# Patient Record
Sex: MALE | Race: WHITE | NOT HISPANIC OR LATINO | Employment: OTHER | URBAN - METROPOLITAN AREA
[De-identification: names, ages, dates, MRNs, and addresses within clinical notes are randomized per-mention and may not be internally consistent; named-entity substitution may affect disease eponyms.]

---

## 2023-05-02 ENCOUNTER — APPOINTMENT (OUTPATIENT)
Dept: PRIMARY CARE | Facility: CLINIC | Age: 28
End: 2023-05-02

## 2023-10-04 ENCOUNTER — TELEPHONE (OUTPATIENT)
Dept: PRIMARY CARE | Facility: CLINIC | Age: 28
End: 2023-10-04

## 2023-10-04 NOTE — TELEPHONE ENCOUNTER
Pt is requesting lab order to test for Thalassemia.  Pt states his girlfriend has this and he just  wants to rule this out now before he has any children in the future.     Does he need an appointment to discuss?    Norman # [phone]

## 2023-10-13 ENCOUNTER — OFFICE VISIT (OUTPATIENT)
Dept: PRIMARY CARE | Facility: CLINIC | Age: 28
End: 2023-10-13

## 2023-10-13 ENCOUNTER — LAB (OUTPATIENT)
Dept: LAB | Facility: LAB | Age: 28
End: 2023-10-13

## 2023-10-13 VITALS
DIASTOLIC BLOOD PRESSURE: 86 MMHG | SYSTOLIC BLOOD PRESSURE: 130 MMHG | WEIGHT: 168.6 LBS | HEART RATE: 92 BPM | HEIGHT: 70 IN | BODY MASS INDEX: 24.14 KG/M2

## 2023-10-13 DIAGNOSIS — R79.89 ABNORMAL COMPLETE BLOOD COUNT: ICD-10-CM

## 2023-10-13 DIAGNOSIS — R79.89 ABNORMAL COMPLETE BLOOD COUNT: Primary | ICD-10-CM

## 2023-10-13 PROBLEM — J30.9 ALLERGIC RHINITIS: Status: ACTIVE | Noted: 2023-10-13

## 2023-10-13 PROBLEM — L23.9 CONTACT ALLERGIC REACTION: Status: ACTIVE | Noted: 2023-10-13

## 2023-10-13 PROBLEM — E78.5 HYPERLIPIDEMIA: Status: ACTIVE | Noted: 2023-10-13

## 2023-10-13 PROCEDURE — 83550 IRON BINDING TEST: CPT

## 2023-10-13 PROCEDURE — 82728 ASSAY OF FERRITIN: CPT

## 2023-10-13 PROCEDURE — 99214 OFFICE O/P EST MOD 30 MIN: CPT | Performed by: FAMILY MEDICINE

## 2023-10-13 PROCEDURE — 85025 COMPLETE CBC W/AUTO DIFF WBC: CPT

## 2023-10-13 PROCEDURE — 1036F TOBACCO NON-USER: CPT | Performed by: FAMILY MEDICINE

## 2023-10-13 PROCEDURE — 83540 ASSAY OF IRON: CPT

## 2023-10-13 PROCEDURE — 36415 COLL VENOUS BLD VENIPUNCTURE: CPT

## 2023-10-13 PROCEDURE — 83020 HEMOGLOBIN ELECTROPHORESIS: CPT | Performed by: FAMILY MEDICINE

## 2023-10-13 PROCEDURE — 83021 HEMOGLOBIN CHROMOTOGRAPHY: CPT

## 2023-10-13 ASSESSMENT — ENCOUNTER SYMPTOMS
PALPITATIONS: 0
EYE DISCHARGE: 0
CONFUSION: 0
ACTIVITY CHANGE: 0
FACIAL SWELLING: 0
COLOR CHANGE: 0
CONSTIPATION: 0
DIARRHEA: 0
CHILLS: 0
COUGH: 0
FEVER: 0
WHEEZING: 0
APPETITE CHANGE: 0
ARTHRALGIAS: 0

## 2023-10-13 NOTE — PROGRESS NOTES
"Subjective   Patient ID: Norman Robles is a 28 y.o. male who presents for To discuss testing for Thalassemia. .    HPI   Patient reports that he is concerned that he might have thalassemia.  He is here to discuss further testing to evaluate.    Review of Systems   Constitutional:  Negative for activity change, appetite change, chills and fever.   HENT:  Negative for congestion, ear pain and facial swelling.    Eyes:  Negative for discharge.   Respiratory:  Negative for cough and wheezing.    Cardiovascular:  Negative for chest pain, palpitations and leg swelling.   Gastrointestinal:  Negative for constipation and diarrhea.   Musculoskeletal:  Negative for arthralgias.   Skin:  Negative for color change and pallor.   Neurological:  Negative for syncope.   Psychiatric/Behavioral:  Negative for confusion.        Objective   /86 (BP Location: Left arm)   Pulse 92   Ht 1.784 m (5' 10.25\")   Wt 76.5 kg (168 lb 9.6 oz)   BMI 24.02 kg/m²   BSA Body surface area is 1.95 meters squared.      Physical Exam  Constitutional:       General: He is not in acute distress.     Appearance: Normal appearance. He is not toxic-appearing.   HENT:      Head: Normocephalic.      Right Ear: Tympanic membrane, ear canal and external ear normal.      Left Ear: Tympanic membrane, ear canal and external ear normal.   Eyes:      Conjunctiva/sclera: Conjunctivae normal.      Pupils: Pupils are equal, round, and reactive to light.   Cardiovascular:      Rate and Rhythm: Normal rate and regular rhythm.      Pulses: Normal pulses.      Heart sounds: Normal heart sounds.   Pulmonary:      Effort: No respiratory distress.      Breath sounds: No wheezing, rhonchi or rales.   Musculoskeletal:         General: No swelling or tenderness.   Skin:     Findings: No lesion or rash.   Neurological:      General: No focal deficit present.      Mental Status: He is alert and oriented to person, place, and time. Mental status is at baseline.      Gait: " Gait normal.   Psychiatric:         Mood and Affect: Mood normal.         Behavior: Behavior normal.         Thought Content: Thought content normal.         Judgment: Judgment normal.       No visits with results within 1 Year(s) from this visit.   Latest known visit with results is:   [facility] Encounter on 06/30/2021   Component Date Value Ref Range Status    WBC 06/30/2021 5.0  4.4 - 11.3 x10E9/L Final    nRBC 06/30/2021 0.0  0.0 - 0.0 /100 WBC Final    RBC 06/30/2021 4.91  4.50 - 5.90 x10E12/L Final    Hemoglobin 06/30/2021 15.5  13.5 - 17.5 g/dL Final    Hematocrit 06/30/2021 45.2  41.0 - 52.0 % Final    MCV 06/30/2021 92  80 - 100 fL Final    MCHC 06/30/2021 34.3  32.0 - 36.0 g/dL Final    Platelets 06/30/2021 214  150 - 450 x10E9/L Final    RDW 06/30/2021 12.1  11.5 - 14.5 % Final    Neutrophils % 06/30/2021 43.8  40.0 - 80.0 % Final    Immature Granulocytes %, Automated 06/30/2021 0.2  0.0 - 0.9 % Final    Comment:  Immature Granulocyte Count (IG) includes promyelocytes,    myelocytes and metamyelocytes but does not include bands.   Percent differential counts (%) should be interpreted in the   context of the absolute cell counts (cells/L).      Lymphocytes % 06/30/2021 45.4  13.0 - 44.0 % Final    Monocytes % 06/30/2021 8.4  2.0 - 10.0 % Final    Eosinophils % 06/30/2021 1.8  0.0 - 6.0 % Final    Basophils % 06/30/2021 0.4  0.0 - 2.0 % Final    Neutrophils Absolute 06/30/2021 2.18  1.20 - 7.70 x10E9/L Final    Lymphocytes Absolute 06/30/2021 2.26  1.20 - 4.80 x10E9/L Final    Monocytes Absolute 06/30/2021 0.42  0.10 - 1.00 x10E9/L Final    Eosinophils Absolute 06/30/2021 0.09  0.00 - 0.70 x10E9/L Final    Basophils Absolute 06/30/2021 0.02  0.00 - 0.10 x10E9/L Final    TSH 06/30/2021 1.74  0.44 - 3.98 mIU/L Final    Comment:  TSH testing is performed using different testing    methodology at [facility] than at other    [facility] hospitals. Direct result comparisons should    only be made within the  same method.      Glucose 06/30/2021 89  74 - 99 mg/dL Final    Sodium 06/30/2021 140  136 - 145 mmol/L Final    Potassium 06/30/2021 4.1  3.5 - 5.3 mmol/L Final    Chloride 06/30/2021 104  98 - 107 mmol/L Final    Bicarbonate 06/30/2021 33 (H)  21 - 32 mmol/L Final    Anion Gap 06/30/2021 7 (L)  10 - 20 mmol/L Final    Urea Nitrogen 06/30/2021 19  6 - 23 mg/dL Final    Creatinine 06/30/2021 0.89  0.50 - 1.30 mg/dL Final    GLOMERULAR FILTRATION RATE-NON AFR* 06/30/2021 >60  >60 mL/min/1.73m2 Final    GLOMERULAR FILTRATION RATE-* 06/30/2021 >60  >60 mL/min/1.73m2 Final    Comment:  CALCULATIONS OF ESTIMATED GFR ARE PERFORMED   USING THE MDRD STUDY EQUATION FOR THE   IDMS-TRACEABLE CREATININE METHODS.   CLIN CHEM 2007;53:766-72      Calcium 06/30/2021 9.8  8.6 - 10.6 mg/dL Final    Albumin 06/30/2021 4.5  3.4 - 5.0 g/dL Final    Alkaline Phosphatase 06/30/2021 46  33 - 120 U/L Final    Total Protein 06/30/2021 6.7  6.4 - 8.2 g/dL Final    AST 06/30/2021 15  9 - 39 U/L Final    Total Bilirubin 06/30/2021 0.6  0.0 - 1.2 mg/dL Final    ALT (SGPT) 06/30/2021 17  10 - 52 U/L Final    Comment:  Patients treated with Sulfasalazine may generate    falsely decreased results for ALT.      Cholesterol 06/30/2021 187  0 - 199 mg/dL Final    Comment: .      AGE      DESIRABLE   BORDERLINE HIGH   HIGH     0-19 Y     0 - 169       170 - 199     >/= 200    20-24 Y     0 - 189       190 - 224     >/= 225         >24 Y     0 - 199       200 - 239     >/= 240   **All ranges are based on fasting samples. Specific   therapeutic targets will vary based on patient-specific   cardiac risk.  .   Pediatric guidelines reference:Pediatrics 2011, 128(S5).   Adult guidelines reference: NCEP ATPIII Guidelines,     ZACH 2001, 258:2486-97  .   Venipuncture immediately after or during the    administration of Metamizole may lead to falsely   low results. Testing should be performed immediately   prior to Metamizole dosing.      HDL 06/30/2021  45.2  mg/dL Final    Comment: .      AGE      VERY LOW   LOW     NORMAL    HIGH       0-19 Y       < 35   < 40     40-45     ----    20-24 Y       ----   < 40       >45     ----      >24 Y       ----   < 40     40-60      >60  .      Cholesterol/HDL Ratio 06/30/2021 4.1   Final    Comment: REF VALUES  DESIRABLE  < 3.4  HIGH RISK  > 5.0      LDL 06/30/2021 123 (H)  0 - 119 mg/dL Final    Comment: .                           NEAR      BORD      AGE      DESIRABLE  OPTIMAL    HIGH     HIGH     VERY HIGH     0-19 Y     0 - 109     ---    110-129   >/= 130     ----    20-24 Y     0 - 119     ---    120-159   >/= 160     ----      >24 Y     0 -  99   100-129  130-159   160-189     >/=190  .      VLDL 06/30/2021 18  0 - 40 mg/dL Final    Triglycerides 06/30/2021 92  0 - 149 mg/dL Final    Comment: .      AGE      DESIRABLE   BORDERLINE HIGH   HIGH     VERY HIGH   0 D-90 D    19 - 174         ----         ----        ----  91 D- 9 Y     0 -  74        75 -  99     >/= 100      ----    10-19 Y     0 -  89        90 - 129     >/= 130      ----    20-24 Y     0 - 114       115 - 149     >/= 150      ----         >24 Y     0 - 149       150 - 199    200- 499    >/= 500  .   Venipuncture immediately after or during the    administration of Metamizole may lead to falsely   low results. Testing should be performed immediately   prior to Metamizole dosing.       No current outpatient medications on file prior to visit.     No current facility-administered medications on file prior to visit.     No images are attached to the encounter.            Assessment/Plan   Diagnoses and all orders for this visit:  Abnormal complete blood count    1.  Patient will have additional blood testing performed  2.  Patient to call for questions or concerns

## 2023-10-14 LAB
BASOPHILS # BLD AUTO: 0.02 X10*3/UL (ref 0–0.1)
BASOPHILS NFR BLD AUTO: 0.4 %
EOSINOPHIL # BLD AUTO: 0.1 X10*3/UL (ref 0–0.7)
EOSINOPHIL NFR BLD AUTO: 1.8 %
ERYTHROCYTE [DISTWIDTH] IN BLOOD BY AUTOMATED COUNT: 11.9 % (ref 11.5–14.5)
FERRITIN SERPL-MCNC: 146 NG/ML (ref 20–300)
HCT VFR BLD AUTO: 48.5 % (ref 41–52)
HGB BLD-MCNC: 16.1 G/DL (ref 13.5–17.5)
IMM GRANULOCYTES # BLD AUTO: 0.01 X10*3/UL (ref 0–0.7)
IMM GRANULOCYTES NFR BLD AUTO: 0.2 % (ref 0–0.9)
IRON SATN MFR SERPL: 66 % (ref 25–45)
IRON SERPL-MCNC: 201 UG/DL (ref 35–150)
LYMPHOCYTES # BLD AUTO: 2.42 X10*3/UL (ref 1.2–4.8)
LYMPHOCYTES NFR BLD AUTO: 42.6 %
MCH RBC QN AUTO: 30.2 PG (ref 26–34)
MCHC RBC AUTO-ENTMCNC: 33.2 G/DL (ref 32–36)
MCV RBC AUTO: 91 FL (ref 80–100)
MONOCYTES # BLD AUTO: 0.45 X10*3/UL (ref 0.1–1)
MONOCYTES NFR BLD AUTO: 7.9 %
NEUTROPHILS # BLD AUTO: 2.68 X10*3/UL (ref 1.2–7.7)
NEUTROPHILS NFR BLD AUTO: 47.1 %
NRBC BLD-RTO: 0 /100 WBCS (ref 0–0)
PLATELET # BLD AUTO: 222 X10*3/UL (ref 150–450)
PMV BLD AUTO: 11.8 FL (ref 7.5–11.5)
RBC # BLD AUTO: 5.33 X10*6/UL (ref 4.5–5.9)
TIBC SERPL-MCNC: 306 UG/DL (ref 240–445)
UIBC SERPL-MCNC: 105 UG/DL (ref 110–370)
WBC # BLD AUTO: 5.7 X10*3/UL (ref 4.4–11.3)

## 2023-10-17 LAB
HEMOGLOBIN A2: 2.9 % (ref 2–3.5)
HEMOGLOBIN A: 96.7 % (ref 95.8–98)
HEMOGLOBIN F: 0.4 % (ref 0–2)
HEMOGLOBIN IDENTIFICATION INTERPRETATION: NORMAL
PATH REVIEW-HGB IDENTIFICATION: NORMAL

## 2024-06-13 ENCOUNTER — TELEPHONE (OUTPATIENT)
Dept: PRIMARY CARE | Facility: CLINIC | Age: 29
End: 2024-06-13

## 2024-06-13 DIAGNOSIS — Z00.00 HEALTHCARE MAINTENANCE: ICD-10-CM

## 2024-07-31 ENCOUNTER — APPOINTMENT (OUTPATIENT)
Dept: PRIMARY CARE | Facility: CLINIC | Age: 29
End: 2024-07-31

## 2024-10-15 ENCOUNTER — OFFICE VISIT (OUTPATIENT)
Dept: URGENT CARE | Facility: CLINIC | Age: 29
End: 2024-10-15

## 2024-10-15 VITALS
BODY MASS INDEX: 22.48 KG/M2 | WEIGHT: 157 LBS | OXYGEN SATURATION: 97 % | DIASTOLIC BLOOD PRESSURE: 72 MMHG | TEMPERATURE: 97.7 F | SYSTOLIC BLOOD PRESSURE: 110 MMHG | HEIGHT: 70 IN | RESPIRATION RATE: 20 BRPM | HEART RATE: 90 BPM

## 2024-10-15 DIAGNOSIS — J98.8 RTI (RESPIRATORY TRACT INFECTION): ICD-10-CM

## 2024-10-15 PROBLEM — E78.5 HYPERLIPIDEMIA: Status: ACTIVE | Noted: 2023-10-13

## 2024-10-15 PROBLEM — L23.9 CONTACT ALLERGIC REACTION: Status: ACTIVE | Noted: 2023-10-13

## 2024-10-15 PROBLEM — J30.9 ALLERGIC RHINITIS: Status: ACTIVE | Noted: 2023-10-13

## 2024-10-15 PROCEDURE — 3074F SYST BP LT 130 MM HG: CPT | Performed by: NURSE PRACTITIONER

## 2024-10-15 PROCEDURE — 99203 OFFICE O/P NEW LOW 30 MIN: CPT | Performed by: NURSE PRACTITIONER

## 2024-10-15 PROCEDURE — 3078F DIAST BP <80 MM HG: CPT | Performed by: NURSE PRACTITIONER

## 2024-10-15 RX ORDER — AZITHROMYCIN 250 MG/1
TABLET, FILM COATED ORAL
Qty: 6 TABLET | Refills: 0 | Status: SHIPPED | OUTPATIENT
Start: 2024-10-15

## 2024-10-18 ENCOUNTER — TELEPHONE (OUTPATIENT)
Dept: PRIMARY CARE | Facility: CLINIC | Age: 29
End: 2024-10-18

## 2024-10-18 NOTE — TELEPHONE ENCOUNTER
Pt coming in on 12/02/24 for annual physical. Pt will see PRITESH Betancourt.  BW?  Pt will go down stairs.

## 2024-10-21 DIAGNOSIS — Z00.00 HEALTHCARE MAINTENANCE: ICD-10-CM

## 2024-12-02 ENCOUNTER — APPOINTMENT (OUTPATIENT)
Dept: PRIMARY CARE | Facility: CLINIC | Age: 29
End: 2024-12-02

## 2024-12-02 ENCOUNTER — LAB (OUTPATIENT)
Dept: LAB | Facility: LAB | Age: 29
End: 2024-12-02

## 2024-12-02 VITALS
HEART RATE: 71 BPM | TEMPERATURE: 97.3 F | BODY MASS INDEX: 23.62 KG/M2 | DIASTOLIC BLOOD PRESSURE: 81 MMHG | WEIGHT: 165 LBS | SYSTOLIC BLOOD PRESSURE: 130 MMHG | HEIGHT: 70 IN | OXYGEN SATURATION: 98 %

## 2024-12-02 DIAGNOSIS — Z00.00 HEALTHCARE MAINTENANCE: ICD-10-CM

## 2024-12-02 DIAGNOSIS — Z79.899 HIGH RISK MEDICATION USE: ICD-10-CM

## 2024-12-02 DIAGNOSIS — Z00.00 ANNUAL PHYSICAL EXAM: Primary | ICD-10-CM

## 2024-12-02 DIAGNOSIS — F40.243 ANXIETY WITH FLYING: ICD-10-CM

## 2024-12-02 DIAGNOSIS — B35.1 ONYCHOMYCOSIS: ICD-10-CM

## 2024-12-02 LAB
ALBUMIN SERPL BCP-MCNC: 4.8 G/DL (ref 3.4–5)
ALP SERPL-CCNC: 45 U/L (ref 33–120)
ALT SERPL W P-5'-P-CCNC: 23 U/L (ref 10–52)
AMPHETAMINES UR QL SCN: NORMAL
ANION GAP SERPL CALC-SCNC: 15 MMOL/L (ref 10–20)
AST SERPL W P-5'-P-CCNC: 16 U/L (ref 9–39)
BARBITURATES UR QL SCN: NORMAL
BASOPHILS # BLD AUTO: 0.03 X10*3/UL (ref 0–0.1)
BASOPHILS NFR BLD AUTO: 0.6 %
BENZODIAZ UR QL SCN: NORMAL
BILIRUB SERPL-MCNC: 0.5 MG/DL (ref 0–1.2)
BUN SERPL-MCNC: 19 MG/DL (ref 6–23)
BZE UR QL SCN: NORMAL
CALCIUM SERPL-MCNC: 10 MG/DL (ref 8.6–10.6)
CANNABINOIDS UR QL SCN: NORMAL
CHLORIDE SERPL-SCNC: 102 MMOL/L (ref 98–107)
CHOLEST SERPL-MCNC: 230 MG/DL (ref 0–199)
CHOLESTEROL/HDL RATIO: 4.5
CO2 SERPL-SCNC: 30 MMOL/L (ref 21–32)
CREAT SERPL-MCNC: 0.92 MG/DL (ref 0.5–1.3)
EGFRCR SERPLBLD CKD-EPI 2021: >90 ML/MIN/1.73M*2
EOSINOPHIL # BLD AUTO: 0.1 X10*3/UL (ref 0–0.7)
EOSINOPHIL NFR BLD AUTO: 1.9 %
ERYTHROCYTE [DISTWIDTH] IN BLOOD BY AUTOMATED COUNT: 12 % (ref 11.5–14.5)
FENTANYL+NORFENTANYL UR QL SCN: NORMAL
GLUCOSE SERPL-MCNC: 84 MG/DL (ref 74–99)
HCT VFR BLD AUTO: 46.4 % (ref 41–52)
HDLC SERPL-MCNC: 51 MG/DL
HGB BLD-MCNC: 15.9 G/DL (ref 13.5–17.5)
IMM GRANULOCYTES # BLD AUTO: 0.01 X10*3/UL (ref 0–0.7)
IMM GRANULOCYTES NFR BLD AUTO: 0.2 % (ref 0–0.9)
LDLC SERPL CALC-MCNC: 157 MG/DL
LYMPHOCYTES # BLD AUTO: 2.47 X10*3/UL (ref 1.2–4.8)
LYMPHOCYTES NFR BLD AUTO: 47.2 %
MCH RBC QN AUTO: 30.5 PG (ref 26–34)
MCHC RBC AUTO-ENTMCNC: 34.3 G/DL (ref 32–36)
MCV RBC AUTO: 89 FL (ref 80–100)
METHADONE UR QL SCN: NORMAL
MONOCYTES # BLD AUTO: 0.36 X10*3/UL (ref 0.1–1)
MONOCYTES NFR BLD AUTO: 6.9 %
NEUTROPHILS # BLD AUTO: 2.26 X10*3/UL (ref 1.2–7.7)
NEUTROPHILS NFR BLD AUTO: 43.2 %
NON HDL CHOLESTEROL: 179 MG/DL (ref 0–149)
NRBC BLD-RTO: 0 /100 WBCS (ref 0–0)
OPIATES UR QL SCN: NORMAL
OXYCODONE+OXYMORPHONE UR QL SCN: NORMAL
PCP UR QL SCN: NORMAL
PLATELET # BLD AUTO: 224 X10*3/UL (ref 150–450)
POTASSIUM SERPL-SCNC: 4.3 MMOL/L (ref 3.5–5.3)
PROT SERPL-MCNC: 7.2 G/DL (ref 6.4–8.2)
RBC # BLD AUTO: 5.22 X10*6/UL (ref 4.5–5.9)
SODIUM SERPL-SCNC: 143 MMOL/L (ref 136–145)
TRIGL SERPL-MCNC: 108 MG/DL (ref 0–149)
TSH SERPL-ACNC: 1.46 MIU/L (ref 0.44–3.98)
VLDL: 22 MG/DL (ref 0–40)
WBC # BLD AUTO: 5.2 X10*3/UL (ref 4.4–11.3)

## 2024-12-02 PROCEDURE — 85025 COMPLETE CBC W/AUTO DIFF WBC: CPT

## 2024-12-02 PROCEDURE — 99214 OFFICE O/P EST MOD 30 MIN: CPT | Performed by: NURSE PRACTITIONER

## 2024-12-02 PROCEDURE — 80307 DRUG TEST PRSMV CHEM ANLYZR: CPT

## 2024-12-02 PROCEDURE — 1036F TOBACCO NON-USER: CPT | Performed by: NURSE PRACTITIONER

## 2024-12-02 PROCEDURE — 36415 COLL VENOUS BLD VENIPUNCTURE: CPT

## 2024-12-02 PROCEDURE — 80053 COMPREHEN METABOLIC PANEL: CPT

## 2024-12-02 PROCEDURE — 3008F BODY MASS INDEX DOCD: CPT | Performed by: NURSE PRACTITIONER

## 2024-12-02 PROCEDURE — 84443 ASSAY THYROID STIM HORMONE: CPT

## 2024-12-02 PROCEDURE — 99395 PREV VISIT EST AGE 18-39: CPT | Performed by: NURSE PRACTITIONER

## 2024-12-02 PROCEDURE — 80061 LIPID PANEL: CPT

## 2024-12-02 ASSESSMENT — ENCOUNTER SYMPTOMS
DEPRESSION: 0
LOSS OF SENSATION IN FEET: 0
OCCASIONAL FEELINGS OF UNSTEADINESS: 0

## 2024-12-02 ASSESSMENT — PATIENT HEALTH QUESTIONNAIRE - PHQ9
10. IF YOU CHECKED OFF ANY PROBLEMS, HOW DIFFICULT HAVE THESE PROBLEMS MADE IT FOR YOU TO DO YOUR WORK, TAKE CARE OF THINGS AT HOME, OR GET ALONG WITH OTHER PEOPLE: NOT DIFFICULT AT ALL
2. FEELING DOWN, DEPRESSED OR HOPELESS: NOT AT ALL
1. LITTLE INTEREST OR PLEASURE IN DOING THINGS: NOT AT ALL
SUM OF ALL RESPONSES TO PHQ9 QUESTIONS 1 AND 2: 0

## 2024-12-02 NOTE — PROGRESS NOTES
Norman Robles is a 29 y.o. male who is presenting for annual physical exam and has a few concerns.     He would like to discuss anxiety with flying. States about 10 years ago, he had a panic attack and passed out while on a flight and ever since then, he gets very anxious and panicky with flying. He he feels hot and is unable to calm down. He is traveling to [place] on 1/12 for 2 weeks. He has multiple flights while on that trip as well. He is hoping to take a medication that will help with the anxiety on these flights.     He also noticed fungus to his right big toe for about a year. Believes it's from having to shower in locker rooms while traveling. Has not taken anything or applied anything to this.     OARRS:  No data recorded  I have personally reviewed the OARRS report for Norman Robles. I have considered the risks of abuse, dependence, addiction and diversion    Is the patient prescribed a combination of a benzodiazepine and opioid?  No    Last Urine Drug Screen / ordered today: Yes  No results found for this or any previous visit (from the past 8760 hours).  N/A        Controlled Substance Agreement:  Date of the Last Agreement: 12/2/2024  Reviewed Controlled Substance Agreement including but not limited to the benefits, risks, and alternatives to treatment with a Controlled Substance medication(s).    Benzodiazepines:  What is the patient's goal of therapy? Treat anxiety while flying    DAILY-7:  No data recorded      Last  Visit: 2021  Reported Health: Good    Dental, Vision, Hearing:  Regular dental visits: no  - Brushes teeth 2 times per day  - Flosses? no  Vision problems: no  - Wears glasses or contacts? no  - Last eye exam:  never  Hearing loss: no    Immunization status:  Up to date: no    Lifestyle:  Healthy diet: yes  Regular exercise: occasionally  Alcohol: yes, 1-2 drinks per day  Tobacco: no  Drugs: no    Reproductive Health:  Sexually active: yes, 1 female partner  Contraception:  condoms    Colorectal Cancer Screening:  Last colonoscopy or Cologuard:  n/a  Metabolic Screening:  Lipid profile: 2021  Glucose screen: 2021    Review of Systems  Gen: denies fever, chills, weight loss, fatigue  HEENT: denies sinus pressure, sinus congestion, runny nose, red eyes, itchy eyes, vision loss, ear pain, hearing loss, throat pain, trouble swallowing  Neck: denies neck pain, neck swelling or masses  CV: denies chest pain, palpitations, fast heart rate, syncope  Resp: denies shortness of breath, cough, wheezing  GI: denies abdominal pain, nausea, diarrhea, constipation, hematochezia, melena  : denies dysuria, hematuria, penile discharge, frequency  Endo: denies polydipsia, polyuria, heat/cold intolerance, weight change, hair thinning  Heme: denies easy bruising, easy bleeding  Neuro: denies headache, numbness, tingling, memory loss, changes in vision  MSK: denies joint pain, joint swelling, weakness  Psych: as noted in HPI. denies suicidal ideation, homicidal ideation, depression, mood swings  Skin: denies rashes, abnormal lesions, itching, changes in moles     Previous History  Past Medical History:   Diagnosis Date    Acute atopic conjunctivitis, unspecified eye 08/24/2016    Allergic conjunctivitis    Other conditions influencing health status 11/20/2020    History of cough    Personal history of other diseases of the respiratory system 03/04/2020    History of acute sinusitis     Past Surgical History:   Procedure Laterality Date    MOUTH SURGERY  01/17/2018    Oral Surgery Tooth Extraction     Social History     Tobacco Use    Smoking status: Never     Passive exposure: Current    Smokeless tobacco: Never   Vaping Use    Vaping status: Never Used   Substance Use Topics    Alcohol use: Yes    Drug use: Never     Family History   Problem Relation Name Age of Onset    No Known Problems Mother      No Known Problems Father      No Known Problems Sister       No Known Allergies  No current outpatient  medications    Physical Exam:  General: Alert and oriented, in no acute distress. Appears stated age, well-nourished, and well hydrated  HEENT:  - Head: Normocephalic and atraumatic   - Eyes: EOMI, PERRLA  - ENT: Hearing grossly intact. Mucus membranes pink and moist without lesions. Tonsils present without swelling or exudates. Good dentition. TMs gray  Neck: Supple. No stiffness. No thyromegaly or thyroid nodules  Heart: RRR. No murmurs, clicks, or rubs  Lungs: Unlabored breathing. CTAB with no crackles, wheezes, or rhonchi  Abdomen: Normal BS in all 4 quadrants. Soft, non-tender, non-distended, with no masses  Extremities: Warm and well perfused. No edema. Normal peripheral pulses  Musculoskeletal: ROM intact. Strength 5/5 in BUE and BLE. No joint swelling. Normal gait and station  Neurological: Alert and oriented. No gross neurological deficits. Normal sensation. No weakness. DTRs +2/4   Psychological: Appropriate mood and affect  Skin: No rash, abnormal lesions, cyanosis, or erythema      Assessment and Plan     Anxiety with flying  - UDS collected, CSA signed  - Pending UDS, will prescribe alprazolam 0.5 mg TID prn with flying; will send #12 due to current travel plans    Onychomycosis  - Check LFTs (CMP already ordered in Oct)  - Will prescribe terbinafine 250 mg every day for 12 weeks     Annual physical  - Declined Tdap, flu vaccines; can get COVID at pharmacy  - Complete labs as ordered by Dr. Echevarria in Oct  - Declined STI screening  - Maintain healthy lifestyle    DESTINEY Rutledge-CNP  [facility]

## 2024-12-03 DIAGNOSIS — B35.1 ONYCHOMYCOSIS: ICD-10-CM

## 2024-12-03 DIAGNOSIS — B35.1 ONYCHOMYCOSIS: Primary | ICD-10-CM

## 2024-12-03 DIAGNOSIS — F40.243 ANXIETY WITH FLYING: ICD-10-CM

## 2024-12-03 RX ORDER — TERBINAFINE HYDROCHLORIDE 250 MG/1
250 TABLET ORAL DAILY
Qty: 84 TABLET | Refills: 0 | Status: SHIPPED | OUTPATIENT
Start: 2024-12-03 | End: 2025-02-25

## 2024-12-03 RX ORDER — ALPRAZOLAM 0.5 MG/1
0.5 TABLET ORAL EVERY 8 HOURS PRN
Qty: 12 TABLET | Refills: 0 | Status: SHIPPED | OUTPATIENT
Start: 2024-12-03

## 2024-12-03 RX ORDER — TERBINAFINE HYDROCHLORIDE 250 MG/1
250 TABLET ORAL DAILY
Qty: 42 TABLET | Refills: 0 | Status: SHIPPED | OUTPATIENT
Start: 2024-12-03 | End: 2024-12-03

## 2024-12-03 NOTE — PROGRESS NOTES
Reviewed LFTs which are WNL. Rx sent for terbinafine 250 mg every day for 6 weeks.     Reviewed UDS which is as expected. OARRS reviewed, no red flags. Rx sent for alprazolam 0.5 mg TID prn anxiety with flying.     Geena Betancourt, DESTINEY-CNP  [facility]

## 2025-04-01 DIAGNOSIS — F40.243 ANXIETY WITH FLYING: ICD-10-CM

## 2025-04-01 RX ORDER — BUSPIRONE HYDROCHLORIDE 5 MG/1
TABLET ORAL
Qty: 60 TABLET | Refills: 0 | Status: SHIPPED | OUTPATIENT
Start: 2025-04-01